# Patient Record
Sex: FEMALE | Race: WHITE
[De-identification: names, ages, dates, MRNs, and addresses within clinical notes are randomized per-mention and may not be internally consistent; named-entity substitution may affect disease eponyms.]

---

## 2018-08-13 ENCOUNTER — HOSPITAL ENCOUNTER (EMERGENCY)
Dept: HOSPITAL 31 - C.ER | Age: 28
Discharge: HOME | End: 2018-08-13
Payer: MEDICAID

## 2018-08-13 VITALS
DIASTOLIC BLOOD PRESSURE: 70 MMHG | TEMPERATURE: 98.2 F | SYSTOLIC BLOOD PRESSURE: 113 MMHG | OXYGEN SATURATION: 99 % | RESPIRATION RATE: 18 BRPM | HEART RATE: 70 BPM
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
## 2018-08-13 DIAGNOSIS — S83.91XA: Primary | ICD-10-CM

## 2018-08-13 NOTE — C.PDOC
History Of Present Illness


28 year old female patient presents to the ER with c/o right knee pain for x2 

days. Patient states she developed it after "partying the past weekend". 

Patient denies recent trauma, weakness, decreased ROM. 


Chief Complaint (Nursing): Lower Extremity Problem/Injury


History Per: Patient


History/Exam Limitations: no limitations


Onset/Duration Of Symptoms: Days (x2 )


Current Symptoms Are (Timing): Still Present





Past Medical History


Reviewed: Historical Data, Nursing Documentation, Vital Signs


Vital Signs: 


 Last Vital Signs











Temp  98.2 F   08/13/18 16:04


 


Pulse  70   08/13/18 16:04


 


Resp  18   08/13/18 16:04


 


BP  113/70   08/13/18 16:04


 


Pulse Ox  99   08/13/18 18:58














- [facility] Procedures








CLOSURE SKIN & SUBCUTANEOUS NEC (12/05/13)


TETANUS TOXOID ADMINIST (12/05/13)








Family History: States: Unknown Family Hx





- Social History


Hx Tobacco Use: No


Hx Alcohol Use: Yes


Hx Substance Use: No





- Immunization History


Hx Tetanus Toxoid Vaccination: Yes (12/05/2013)


Hx Influenza Vaccination: No


Hx Pneumococcal Vaccination: No





Review Of Systems


Except As Marked, All Systems Reviewed And Found Negative.


Constitutional: Negative for: Weakness, Other (trauma on knee; decrease ROM)


Musculoskeletal: Positive for: Leg Pain (knee)





Physical Exam





- Physical Exam


Appears: Well, Non-toxic, No Acute Distress


Skin: Normal Color, Warm, Dry


Head: Atraumatic, Normacephalic


Oral Mucosa: Moist


Neck: Normal ROM, Supple


Chest: Symmetrical, No Deformity


Cardiovascular: Rhythm Regular


Respiratory: Normal Breath Sounds


Extremity: Normal ROM (x4), Tenderness (right infrapatellar tenderness), No 

Calf Tenderness, Capillary Refill (<2 sec), No Deformity, No Swelling


Pulses: Left Dorsalis Pedis: Normal, Right Dorsalis Pedis: Normal


Neurological/Psych: Oriented x3, Normal Speech, Normal Motor, Normal Sensation, 

Normal Reflexes


Gait: Steady





ED Course And Treatment


O2 Sat by Pulse Oximetry: 99 (RA)


Pulse Ox Interpretation: Normal





- Other Rad


  ** Right knee XR


X-Ray: Interpreted by Me, Viewed By Me


Interpretation: No fracture. No dislocation





Medical Decision Making


Medical Decision Making: 


Impression: right knee pain


Plans: 


-- Knee XR


Reassess: Patient is resting comfortably. NAD. Patient is advised to f/u with 

PCP in 1-2 days. 





Disposition





- Disposition


Referrals: 


[facility] Danie Jenkins, [Non-Staff] - 


Disposition: HOME/ ROUTINE


Disposition Time: 18:30


Condition: GOOD


Additional Instructions: 





JAVON CHRISTIAN, thank you for letting us take care of you today. Your provider 

was Doni Ibarra DO and you were treated for RT KNEE SWOLLEN/PAIN. The 

emergency medical care you received today was directed at your acute symptoms. 

If you were prescribed any medication, please fill it and take as directed. It 

may take several days for your symptoms to resolve. Return to the Emergency 

Department if your symptoms worsen, do not improve, or if you have any other 

problems.





Please contact your doctor or call one of the physicians/clinics you have been 

referred to that are listed on the Patient Visit Information form that is 

included in your discharge packet. Bring any paperwork you were given at 

discharge with you along with any medications you are taking to your follow up 

visit. Our treatment cannot replace ongoing medical care by a primary care 

provider outside of the emergency department.





Thank you for allowing the [facility] team to be part of your care today.

















Follow up with your primary care doctor in 5-7 days if you have any concerns.


Prescriptions: 


Ibuprofen [Motrin] 600 mg PO Q6 PRN #20 tab


 PRN Reason: Pain, Moderate (4-7)


Instructions:  Knee Sprain (DC)


Forms:  [facility] (English)





- Clinical Impression


Clinical Impression: 


 Knee sprain








- Scribe Statement


The provider has reviewed the documentation as recorded by the Zuhair Clarke Do


Provider Attestation: 


All medical record entries made by the Scribe were at my direction and 

personally dictated by me. I have reviewed the chart and agree that the record 

accurately reflects my personal performance of the history, physical exam, 

medical decision making, and the department course for this patient. I have 

also personally directed, reviewed, and agree with the discharge instructions 

and disposition.

## 2018-08-14 NOTE — RAD
Date of service: 



08/13/2018



PROCEDURE:  Right Knee Radiographs.



HISTORY:

r/o fx



COMPARISON:

None.



FINDINGS:



BONES:

No fracture 



JOINTS:

No significant appearing osteoarthrosis 



JOINT EFFUSION:

None. 



OTHER FINDINGS:

2 mm calcific or ossific density projects anterior pre tibial soft 

tissues - nonspecific 



IMPRESSION:

No fracture or dislocation. No joint effusion